# Patient Record
(demographics unavailable — no encounter records)

---

## 2025-01-21 NOTE — HISTORY OF PRESENT ILLNESS
[TextEntry] : Marcellus is a 8yo asymptomatic M with family history of cardiomyopathy.  FamHx: 41yo father with PMH of hypertrophic obstructive cardiomyopathy s/p implantation of AICD, s/p septal myomectomy, non-rheumatic mitral valve regurgitation s/p mitral valve repair. Father underwent genetic testing ([employer] [id number]) and was found positive for a pathogenic variant in the MYH7 gene is associated with autosomal dominant hypertrophic cardiomyopathy (HCM), dilated cardiomyopathy (DCM), left ventricular noncompaction (LVNC), and [place] distal myopathy. It is also associated with autosomal dominant and recessive myosin storage myopathy (MSMA) and autosomal dominant scapuloperoneal myopathy (SPMM). The specific variant identified in Mr Duque has been observed in individuals with hypertrophic cardiomyopathy, which is also consistent with his phenotype.  He was also found positive for a variant of uncertain significance in the CDH2, LAMA4 and RYR2 genes which do not explain molecular diagnosis for Mr Duque.  Patient presents today for cardiogenomics evaluation.

## 2025-01-21 NOTE — PLAN
[TextEntry] : 1. Informed Consent was obtained for the [employer] single gene MYH7 known variant. 2. A buccal swab sample collection kit will be sent to the patient's residence to collect the sample and send it to [facility] for analysis, pending insurance authorization. 3. A follow-up appointment was scheduled in 2 months to discuss genetic testing results. Results generally return in 4-6 weeks.  For any additional questions please call Karen Villalpando MS, HEYDI, Cardiogenomic Program at [phone]  Karen Villalpando MS, HEYDI Assistant Chief, Pediatric Cardiogenomics, [facility] Director Genetics, Program for Cardiac Genetics, Genomics and Precision Medicine  in the Departments of Pediatrics and Cardiology, [facility] [employer], [facility]

## 2025-01-21 NOTE — DISCUSSION/SUMMARY
[TextEntry] : Marcellus is a 8yo asymptomatic M with family history of cardiomyopathy.  FamHx: 41yo father with PMH of hypertrophic obstructive cardiomyopathy s/p implantation of AICD, s/p septal myomectomy, non-rheumatic mitral valve regurgitation s/p mitral valve repair. Father underwent genetic testing ([id number]) and was found positive for a pathogenic variant in the MYH7 gene is associated with autosomal dominant hypertrophic cardiomyopathy (HCM), dilated cardiomyopathy (DCM), left ventricular noncompaction (LVNC), and [place] distal myopathy. It is also associated with autosomal dominant and recessive myosin storage myopathy (MSMA) and autosomal dominant scapuloperoneal myopathy (SPMM). The specific variant identified in Mr Duque has been observed in individuals with hypertrophic cardiomyopathy, which is also consistent with his phenotype.  He was also found positive for a variant of uncertain significance in the CDH2, LAMA4 and RYR2 genes which do not explain molecular diagnosis for Mr Duque.  We reviewed the risks, benefits, limitations, and implications of genetic testing.  Additionally, we examined the patients' motivation for testing, and the emotional ramifications of the test results.  The patient is aware that results may impact family members.    After a review of testing options, Marcellus's parents elected for him to undergo the custom targeted panel offered through [employer]. Panel contain 1 gene (MYH7 gene) associated with HCM.  We also reviewed the pedigree for other family members at risk.    Parents expressed understanding of the presented information and satisfaction with having all of their questions and concerns addressed.

## 2025-01-21 NOTE — ASSESSMENT
[TextEntry] :     Due to his family history he has a class 1 indication for targeted cascade family testing as set forth in Christiano RE, Karla M, Ed CY et al. Genetic Evaluation of Cardiomyopathy - a Heart Failure Society of [place] Practice Guideline, Journal of Cardiac Failure (2018), https://doi.org/10.1016/j.cardfail.2018.03.004. Marcellus is a 6yo asymptomatic M with family history of cardiomyopathy.  FamHx: 39yo father with PMH of hypertrophic obstructive cardiomyopathy s/p implantation of AICD, s/p septal myomectomy, non-rheumatic mitral valve regurgitation s/p mitral valve repair. Father underwent genetic testing ([employer] [id number]) and was found positive for a pathogenic variant in the MYH7 gene is associated with autosomal dominant hypertrophic cardiomyopathy (HCM)  He was also found positive for a variant of uncertain significance in the CDH2, LAMA4 and RYR2 genes which do not explain molecular diagnosis for Mr Duque.  He meets the criteria for genetic testing, and his parents elected to undergo genetic testing due to concerns for his personal history, definitive diagnosis, disease management, family history, and concern for the health of family members. Results may change medical management for the patient and may affect the healthcare of other family members. The pedigree was reviewed with the patients to identify other family members at risk. Parents expressed understanding of the presented information and satisfaction with having all of their questions and concerns addressed.